# Patient Record
Sex: FEMALE | HISPANIC OR LATINO
[De-identification: names, ages, dates, MRNs, and addresses within clinical notes are randomized per-mention and may not be internally consistent; named-entity substitution may affect disease eponyms.]

---

## 2019-08-18 ENCOUNTER — TRANSCRIPTION ENCOUNTER (OUTPATIENT)
Age: 24
End: 2019-08-18

## 2019-08-21 ENCOUNTER — TRANSCRIPTION ENCOUNTER (OUTPATIENT)
Age: 24
End: 2019-08-21

## 2019-11-11 PROBLEM — Z00.00 ENCOUNTER FOR PREVENTIVE HEALTH EXAMINATION: Status: ACTIVE | Noted: 2019-11-11

## 2019-11-15 ENCOUNTER — APPOINTMENT (OUTPATIENT)
Dept: COLORECTAL SURGERY | Facility: CLINIC | Age: 24
End: 2019-11-15
Payer: MEDICAID

## 2019-11-15 VITALS
BODY MASS INDEX: 29.71 KG/M2 | OXYGEN SATURATION: 99 % | SYSTOLIC BLOOD PRESSURE: 120 MMHG | WEIGHT: 174 LBS | DIASTOLIC BLOOD PRESSURE: 77 MMHG | HEIGHT: 64 IN | HEART RATE: 94 BPM

## 2019-11-15 DIAGNOSIS — Z78.9 OTHER SPECIFIED HEALTH STATUS: ICD-10-CM

## 2019-11-15 DIAGNOSIS — K64.9 UNSPECIFIED HEMORRHOIDS: ICD-10-CM

## 2019-11-15 DIAGNOSIS — K59.09 OTHER CONSTIPATION: ICD-10-CM

## 2019-11-15 DIAGNOSIS — R10.32 LEFT LOWER QUADRANT PAIN: ICD-10-CM

## 2019-11-15 PROCEDURE — 46600 DIAGNOSTIC ANOSCOPY SPX: CPT

## 2019-11-15 PROCEDURE — 99203 OFFICE O/P NEW LOW 30 MIN: CPT | Mod: 25

## 2019-11-15 RX ORDER — LORATADINE 10 MG
17 TABLET,DISINTEGRATING ORAL
Refills: 0 | Status: ACTIVE | COMMUNITY

## 2019-11-15 NOTE — HISTORY OF PRESENT ILLNESS
[FreeTextEntry1] : 23-year-old female who presents for consultation for hemorrhoids and constipation. She has a long-standing history of constipation ever since childhood. Over the past year, her symptoms seem to have worsened. She has been on MiraLax chronically. Over the past 4-5 months, she has developed left-sided abdominal pain which is constant in nature. It is worse when she is more constipated and improves following bowel movements but never resolves. She does have some nausea with worsening pain. No fevers, chills. She reports bright red blood per rectum when she is severely constipated. Her hemorrhoids also flare up from time to time as a result of straining. Without MiraLax, her baseline is about one bowel movement a week period with MiraLax, she is able to have bowel movements almost every day. She has recently increased the amount of MiraLax to twice a day. No prior colonoscopy

## 2019-11-15 NOTE — CONSULT LETTER
[Consult Letter:] : I had the pleasure of evaluating your patient, [unfilled]. [Dear  ___] : Dear  [unfilled], [Sincerely,] : Sincerely, [Please see my note below.] : Please see my note below. [Consult Closing:] : Thank you very much for allowing me to participate in the care of this patient.  If you have any questions, please do not hesitate to contact me. [FreeTextEntry3] : Larry Mason MD\par

## 2019-11-15 NOTE — PHYSICAL EXAM
[Normal] : was normal [None] : there was no rectal mass  [Calm] : calm [Respiratory Effort] : normal respiratory effort [Normal Rate and Rhythm] : normal rate and rhythm [Excoriation] : no perianal excoriation [Gross Blood] : no gross blood [de-identified] : noninflamed external hemorrhoids with anterior hemorrhoid skin tag [de-identified] : soft, tender in the left mid to lower abdomen and suprapubic region. No rebound or guarding. Nondistended. No mass or hernia appreciated. [de-identified] : grade 2 internal hemorrhoid [de-identified] : most extremities without difficulty [de-identified] : well appearing, in no distress [de-identified] : normocephalic, atraumatic [de-identified] : warm and dry [de-identified] : alert and oriented x3

## 2019-12-06 ENCOUNTER — APPOINTMENT (OUTPATIENT)
Dept: COLORECTAL SURGERY | Facility: CLINIC | Age: 24
End: 2019-12-06

## 2020-01-03 ENCOUNTER — TRANSCRIPTION ENCOUNTER (OUTPATIENT)
Age: 25
End: 2020-01-03